# Patient Record
Sex: FEMALE | Race: BLACK OR AFRICAN AMERICAN | HISPANIC OR LATINO | Employment: UNEMPLOYED | URBAN - METROPOLITAN AREA
[De-identification: names, ages, dates, MRNs, and addresses within clinical notes are randomized per-mention and may not be internally consistent; named-entity substitution may affect disease eponyms.]

---

## 2023-09-12 ENCOUNTER — OFFICE VISIT (OUTPATIENT)
Dept: FAMILY MEDICINE CLINIC | Facility: CLINIC | Age: 38
End: 2023-09-12

## 2023-09-12 VITALS
TEMPERATURE: 98.3 F | BODY MASS INDEX: 19.97 KG/M2 | RESPIRATION RATE: 18 BRPM | WEIGHT: 117 LBS | OXYGEN SATURATION: 98 % | HEIGHT: 64 IN | HEART RATE: 74 BPM | DIASTOLIC BLOOD PRESSURE: 70 MMHG | SYSTOLIC BLOOD PRESSURE: 112 MMHG

## 2023-09-12 DIAGNOSIS — K62.89 RECTAL PAIN: ICD-10-CM

## 2023-09-12 DIAGNOSIS — K59.00 CONSTIPATION, UNSPECIFIED CONSTIPATION TYPE: ICD-10-CM

## 2023-09-12 DIAGNOSIS — K64.9 HEMORRHOIDS, UNSPECIFIED HEMORRHOID TYPE: Primary | ICD-10-CM

## 2023-09-12 PROCEDURE — 99204 OFFICE O/P NEW MOD 45 MIN: CPT | Performed by: FAMILY MEDICINE

## 2023-09-12 RX ORDER — HYDROCORTISONE 25 MG/G
CREAM TOPICAL 2 TIMES DAILY
Qty: 28 G | Refills: 1 | Status: SHIPPED | OUTPATIENT
Start: 2023-09-12 | End: 2023-09-19

## 2023-09-12 RX ORDER — POLYETHYLENE GLYCOL 3350 17 G/17G
17 POWDER, FOR SOLUTION ORAL DAILY
Qty: 510 G | Refills: 1 | Status: SHIPPED | OUTPATIENT
Start: 2023-09-12

## 2023-09-12 NOTE — ASSESSMENT & PLAN NOTE
We will provide topical which mannie pads and Anusol cream to use as needed  High-fiber diet discussed with patient

## 2023-09-12 NOTE — PROGRESS NOTES
Name: Anuja Gonzalez      : 1985      MRN: [id number]  Encounter Provider: Melyssa Cosby  Encounter Date: 2023   Encounter department: [address] [facility],[address]     1. Hemorrhoids, unspecified hemorrhoid type  Assessment & Plan: We will provide topical which mannie pads and Anusol cream to use as needed  High-fiber diet discussed with patient      Orders:  -     hydrocortisone (ANUSOL-HC) 2.5 % rectal cream; Apply topically 2 (two) times a day for 7 days  -     witch hazel-glycerin (TUCKS) topical pad; Insert 1 Pad into the rectum as needed for irritation or hemorrhoids    2. Constipation, unspecified constipation type  Assessment & Plan:  Recommend high-fiber diet  Recommend increasing fluid intake    Orders:  -     polyethylene glycol (GLYCOLAX) 17 GM/SCOOP powder; Take 17 g by mouth daily    3. Rectal pain  -     hydrocortisone (ANUSOL-HC) 2.5 % rectal cream; Apply topically 2 (two) times a day for 7 days  -     witch hazel-glycerin (TUCKS) topical pad; Insert 1 Pad into the rectum as needed for irritation or hemorrhoids         Subjective      32-year-old female with no significant past medical history who presents today for a few months onset of anal irritation  A few months of rectal pain with bowel movements  Patient denies any rectal bleeding  Patient does endorse mild constipation with straining with bowel movements  Patient denies any fever, chills, nausea vomiting, or diarrhea          Review of Systems   Constitutional: Negative for chills, diaphoresis, fatigue and fever. Eyes: Negative for visual disturbance. Respiratory: Negative for cough, shortness of breath and wheezing. Cardiovascular: Negative for chest pain, palpitations and leg swelling. Gastrointestinal: Positive for constipation and rectal pain. Negative for abdominal pain, blood in stool, diarrhea, nausea and vomiting.    Genitourinary: Negative for dysuria, hematuria and urgency. Musculoskeletal: Negative for arthralgias and gait problem. Skin: Negative for rash. Neurological: Negative for syncope, weakness, numbness and headaches. Hematological: Negative for adenopathy. Psychiatric/Behavioral: Negative for agitation. No current outpatient medications on file prior to visit. Objective     /70 (BP Location: Right arm, Patient Position: Sitting, Cuff Size: Standard)   Pulse 74   Temp 98.3 °F (36.8 °C) (Temporal)   Resp 18   Ht 5' 4" (1.626 m)   Wt 53.1 kg (117 lb)   LMP 08/16/2023 (Approximate)   SpO2 98%   BMI 20.08 kg/m²     Physical Exam  Exam conducted with a chaperone present. Constitutional:       General: She is not in acute distress. Appearance: Normal appearance. She is well-developed. She is not ill-appearing, toxic-appearing or diaphoretic. HENT:      Head: Normocephalic and atraumatic. Right Ear: External ear normal.      Left Ear: External ear normal.      Nose: Nose normal.   Eyes:      General: No scleral icterus. Right eye: No discharge. Left eye: No discharge. Extraocular Movements: Extraocular movements intact. Pupils: Pupils are equal, round, and reactive to light. Neck:      Thyroid: No thyromegaly. Vascular: No JVD. Trachea: No tracheal deviation. Cardiovascular:      Rate and Rhythm: Normal rate and regular rhythm. Heart sounds: Normal heart sounds. No murmur heard. No friction rub. No gallop. Pulmonary:      Effort: Pulmonary effort is normal. No respiratory distress. Breath sounds: Normal breath sounds. No stridor. No wheezing or rhonchi. Abdominal:      General: Bowel sounds are normal. There is no distension. Palpations: Abdomen is soft. There is no mass. Tenderness: There is no abdominal tenderness. There is no guarding or rebound. Hernia: No hernia is present.    Genitourinary:     Rectum: External hemorrhoid present. Musculoskeletal:         General: Normal range of motion. Cervical back: Normal range of motion. Skin:     General: Skin is warm. Capillary Refill: Capillary refill takes less than 2 seconds. Findings: No rash. Neurological:      General: No focal deficit present. Mental Status: She is alert and oriented to person, place, and time. Cranial Nerves: No cranial nerve deficit. Motor: No weakness or abnormal muscle tone.       Gait: Gait normal.   Psychiatric:         Mood and Affect: Mood normal.         Behavior: Behavior normal.       [address]

## 2024-04-29 ENCOUNTER — ANNUAL EXAM (OUTPATIENT)
Dept: OBGYN CLINIC | Facility: CLINIC | Age: 39
End: 2024-04-29

## 2024-04-29 ENCOUNTER — PATIENT OUTREACH (OUTPATIENT)
Dept: OBGYN CLINIC | Facility: CLINIC | Age: 39
End: 2024-04-29

## 2024-04-29 VITALS
HEART RATE: 85 BPM | BODY MASS INDEX: 21.51 KG/M2 | HEIGHT: 64 IN | DIASTOLIC BLOOD PRESSURE: 77 MMHG | SYSTOLIC BLOOD PRESSURE: 116 MMHG | WEIGHT: 126 LBS

## 2024-04-29 DIAGNOSIS — Z13.31 POSITIVE DEPRESSION SCREENING: ICD-10-CM

## 2024-04-29 DIAGNOSIS — Z01.419 ENCOUNTER FOR ANNUAL ROUTINE GYNECOLOGICAL EXAMINATION: Primary | ICD-10-CM

## 2024-04-29 DIAGNOSIS — Z59.819 HOUSING INSTABILITY: ICD-10-CM

## 2024-04-29 PROCEDURE — G0476 HPV COMBO ASSAY CA SCREEN: HCPCS | Performed by: NURSE PRACTITIONER

## 2024-04-29 PROCEDURE — 99385 PREV VISIT NEW AGE 18-39: CPT | Performed by: NURSE PRACTITIONER

## 2024-04-29 PROCEDURE — G0145 SCR C/V CYTO,THINLAYER,RESCR: HCPCS | Performed by: NURSE PRACTITIONER

## 2024-04-29 SDOH — ECONOMIC STABILITY - HOUSING INSECURITY: HOUSING INSTABILITY UNSPECIFIED: Z59.819

## 2024-04-29 NOTE — LETTER
2024    To Jacqueline Waller  : 1985      This letter is to advise you that your recent PAP SMEAR results were reviewed by me and are NORMAL.  We will see you in 1 year for your annual exam.    BRANDEE Fortune

## 2024-04-29 NOTE — PROGRESS NOTES
ROMEO ROLLINS spoke with 40 y/o-S-P4-  Panamanian speaking woman to address her needs. ROMEO ROLLINS introduced self and discussed the reason for the call. Pt and oldest son are renting a room in [place]. Pt's youngest kids remains in . Pt relocated from [place] 14 months ago. Pt is full working but is not enough to have end meets. Pt has [employer]. ROMEO ROLLINS discussed Food [employer] programs and provided information via Find help.     Pt main support is her 21 y/o son and her sister who resides in [place]. Pt not eligible to subsidized housing and unable to afford own place at this time.  ROMEO ROLLINS provided listening counseling and provided contact information. Pt was encouraged to call at any time needed.

## 2024-04-29 NOTE — PROGRESS NOTES
Annual Exam    Assessment   1. Encounter for annual routine gynecological examination  Liquid-based pap, screening      2. Housing instability          well woman       Plan       All questions answered.  Breast self exam technique reviewed and patient encouraged to perform self-exam monthly.  Contraception: none.  Discussed healthy lifestyle modifications.  Follow up in 1 year.  Thin prep Pap smear.     Patient Instructions   PAP results can take up to 2 weeks  Call with needs or concerns  Annual GYN exam in 1 year  Pt verbalized understanding of all discussed.      Subjective      Jacqueline Waller is a 39 y.o.  female who presents for annual well woman exam. Periods are regular every 28-30 days, lasting 5 days. No intermenstrual bleeding, spotting, or discharge.  Last GYN exam was 1 year ago in the [place], pt states all PAP's have been WNL  1 partner x 5 months, denies domestic violence    Depression Screening Follow-up Plan: Patient's depression screening was positive with a PHQ-2 score of 6. Their PHQ-9 score was 18. Denies suicidal thought. Social work referral provided.    Current contraception: none, pt is considering a pregnancy  History of abnormal Pap smear: no  Family history of uterine or ovarian cancer: no ? Possibly vaginal cancer   Regular self breast exam: yes  History of abnormal mammogram: N/A  Family history of breast cancer: no  History of abnormal lipids: unknown  Menstrual History:  OB History          6    Para   4    Term                AB   2    Living   4         SAB   2    IAB        Ectopic        Multiple        Live Births   4                Menarche age: 14  Patient's last menstrual period was 2024 (approximate).       The following portions of the patient's history were reviewed and updated as appropriate: allergies, current medications, past family history, past medical history, past social history, past surgical history and problem  "list.    Review of Systems  Pertinent items are noted in HPI.      Objective      /77 (BP Location: Left arm, Patient Position: Sitting, Cuff Size: Standard)   Pulse 85   Ht 5' 4\" (1.626 m)   Wt 57.2 kg (126 lb)   LMP 04/19/2024 (Approximate)   BMI 21.63 kg/m²     General: alert and oriented, in no acute distress, alert, appears stated age, and cooperative   Heart: NSR   Lungs: clear to auscultation bilaterally, WNL respiratory effort, negative cough or SOB   Thyroid: Negative masses palpable   Abdomen: soft, non-tender, without masses or organomegaly palpable   Vulva: normal   Vagina: normal mucosa   Cervix: no bleeding following Pap, no cervical motion tenderness, and no lesions   Uterus: normal size, non-tender, normal shape and consistency   Adnexa: normal adnexa   Urethra: normal   Breasts: NT,negative masses palpable, negative discharge, or dimpling             "

## 2024-04-30 LAB
HPV HR 12 DNA CVX QL NAA+PROBE: NEGATIVE
HPV16 DNA CVX QL NAA+PROBE: NEGATIVE
HPV18 DNA CVX QL NAA+PROBE: NEGATIVE

## 2024-05-02 ENCOUNTER — PATIENT OUTREACH (OUTPATIENT)
Dept: OTHER | Facility: OTHER | Age: 39
End: 2024-05-02

## 2024-05-02 PROBLEM — H04.123 DRY EYES: Status: ACTIVE | Noted: 2024-05-02

## 2024-05-03 NOTE — PROGRESS NOTES
05/2/2024    Encountered patient at [facility]. Looking for work. Referred patient to Jeanie ALEJO via email to connect with [employer]. No further outreach scheduled.

## 2024-05-06 LAB
LAB AP GYN PRIMARY INTERPRETATION: NORMAL
Lab: NORMAL

## 2024-05-10 ENCOUNTER — TELEPHONE (OUTPATIENT)
Dept: FAMILY MEDICINE CLINIC | Facility: CLINIC | Age: 39
End: 2024-05-10

## 2024-05-10 NOTE — TELEPHONE ENCOUNTER
first attempt to contact patient. left message to return my call on answering machine    Lvm in regards of pt seeing dr christopher due to dr estrada unavailability today.

## 2025-04-02 LAB
EXTERNAL HIV CONFIRMATION: NORMAL
EXTERNAL HIV SCREEN: NORMAL

## 2025-05-14 ENCOUNTER — TELEPHONE (OUTPATIENT)
Dept: OBGYN CLINIC | Facility: CLINIC | Age: 40
End: 2025-05-14

## 2025-06-24 ENCOUNTER — TELEPHONE (OUTPATIENT)
Dept: ADMINISTRATIVE | Facility: OTHER | Age: 40
End: 2025-06-24

## 2025-06-24 NOTE — TELEPHONE ENCOUNTER
----- Message from Rachel OHARA sent at 6/20/2025  3:16 PM EDT -----  Regarding: Care Gap Request    hiv  06/20/25 3:16 PM    Hello, our patient attached above has had HIV completed/performed. Please assist in updating the patient chart by pulling the Care Everywhere (CE) document. The date of service is 4/2/2025.     Thank you,  Rachel Robles   [facility]

## 2025-06-26 NOTE — TELEPHONE ENCOUNTER
Upon review of the In Basket request we were able to locate, review, and update the patient chart as requested for HIV.    Any additional questions or concerns should be emailed to the Practice Liaisons via the appropriate education email address, please do not reply via In Basket.    Thank you  Rachel Wolf MA   [facility]